# Patient Record
Sex: MALE | Race: WHITE | NOT HISPANIC OR LATINO | Employment: OTHER | URBAN - METROPOLITAN AREA
[De-identification: names, ages, dates, MRNs, and addresses within clinical notes are randomized per-mention and may not be internally consistent; named-entity substitution may affect disease eponyms.]

---

## 2017-01-03 ENCOUNTER — GENERIC CONVERSION - ENCOUNTER (OUTPATIENT)
Dept: OTHER | Facility: OTHER | Age: 72
End: 2017-01-03

## 2017-01-06 ENCOUNTER — GENERIC CONVERSION - ENCOUNTER (OUTPATIENT)
Dept: OTHER | Facility: OTHER | Age: 72
End: 2017-01-06

## 2017-01-11 ENCOUNTER — ALLSCRIPTS OFFICE VISIT (OUTPATIENT)
Dept: OTHER | Facility: OTHER | Age: 72
End: 2017-01-11

## 2017-01-11 DIAGNOSIS — I48.92 ATRIAL FLUTTER (HCC): ICD-10-CM

## 2017-01-24 ENCOUNTER — GENERIC CONVERSION - ENCOUNTER (OUTPATIENT)
Dept: OTHER | Facility: OTHER | Age: 72
End: 2017-01-24

## 2018-01-11 NOTE — MISCELLANEOUS
History of Present Illness  Communication  There was no answer and no voicemail was available  A call was made to the facility  Future Appointments    Date/Time Provider Specialty Site   01/11/2017 02:40 PM MATEUSZ Alfaro   Cardiology [facility] CARDIOLOGY [place]     Signatures   Electronically signed by : Geneva Alvarez, ; Alverto  3 2017  1:54PM EST                       (Author)

## 2018-01-12 NOTE — MISCELLANEOUS
History of Present Illness    The patient is being contacted for follow-up after hospitalization  Hospitalization The hospitalization was not a readmission, The patient was discharged on 12/8/16  He has a high risk for readmission  He was discharged to a SNF for rehabilitation  The patient's status is short term  The [facility] name is: [facility]  Phone number: [phone]  The best contact is DCD of unit  Floor/Location: [facility] [address] [facility] 1st floor/[phone]  I spoke with Merissa Reynoso Or  The patient's discharge weight is 202 hospital The patient's weight today is 201  Patient is experiencing the following symptoms:  The patient is currently asymptomatic  no acute  [place] Issues include: none  Treatment Questions: Diet ordered is FADIA, the fluid restriction is being followed, staff is aware of symptoms to report, discharge medications were reconciled with the facility provider/PCP, the patient/family is compliant with diet, daily weights are being done and a plan is in place for who to call for worsening symptoms  The patient has the following appointments:    states unit clerk is scheduling  Staff re-education topics include diet, need for daily weights, fluid restriction, symptoms to report, patient and family education and importance of compliance with treatment  Future Appointments    Date/Time Provider Specialty Site   01/11/2017 02:40 PM MATEUSZ English   Cardiology [address] [facility] CARDIOLOGY [place]     Signatures   Electronically signed by : Benigno Arauz, ; Dec  9 2016  2:45PM EST                       (Author)

## 2018-01-12 NOTE — MISCELLANEOUS
History of Present Illness  A call was made to the facility  The contact name and phone number is  Car Michelle nurse at [facility] [address] 1st floor  Status Check: today's weight is "can't get to a computer" But reports has been stable  Patient is experiencing the following symptoms:  The patient is not experiencing signs of heart failure  no acute symptoms   Concerns expressed today consisted of: patient struggles with fluid restriction  Counseling provided to patient's caretaker  Topics counseled included diet, importance of compliance with treatment and symptoms to report  Dr Khari Ram in January [address] [facility] Additional Notes: I offered a sooner cardiology appointment and she will check with facility doctor, Dr Marcel Martin, today when he rounds  Future Appointments    Date/Time Provider Specialty Site   01/11/2017 02:40 PM MATEUSZ Grace   Cardiology [facility]ARDIOLOG[facility] [place]     Signatures   Electronically signed by : Katlin Brown, ; Dec 19 2016  1:21PM EST                       (Author)

## 2018-01-13 VITALS — DIASTOLIC BLOOD PRESSURE: 80 MMHG | HEART RATE: 84 BPM | SYSTOLIC BLOOD PRESSURE: 106 MMHG

## 2018-03-07 NOTE — PROGRESS NOTES
Dear Isi Waterman,  My name is West allis and I am a Registered Nurse Care Coordinator for [address]  I am contacting you  because you were recently in the hospital   If you are interested, I am here to assist you as part of a Medicare program called [address]  I have enclosed information about this program for your review and my contact information  This is free of charge to you, as part of the program   I would call you periodically throughout the next 90 days to assist with any questions you might have, discuss any symptoms you may be having,  or get you in to see your doctor if needed  We  will discuss and review your medications and goals you may have for yourself  Please let me know if you agree to this by contacting me at [phone]      Sincerely,      Laci Mcadams RN  [address] [facility]        Electronically signed by:Dianne Posadas RN  Jan 24 2017 11:35AM EST